# Patient Record
Sex: FEMALE | Race: WHITE | NOT HISPANIC OR LATINO | URBAN - METROPOLITAN AREA
[De-identification: names, ages, dates, MRNs, and addresses within clinical notes are randomized per-mention and may not be internally consistent; named-entity substitution may affect disease eponyms.]

---

## 2024-05-21 NOTE — ASU PATIENT PROFILE, ADULT - NS PREOP UNDERSTANDS INFO
No solid food/dairy/candy/gum after midnight; water allowed before 05:30am; reminded to come with photo ID/insurance card; escort to have a photo ID; dress in comfortable clothes; no jewelries/contact lens; no smoking/drinking alcohol/illicit drug use today; address and callback number was given./yes

## 2024-05-22 ENCOUNTER — OUTPATIENT (OUTPATIENT)
Dept: OUTPATIENT SERVICES | Facility: HOSPITAL | Age: 79
LOS: 1 days | Discharge: HOME | End: 2024-05-22

## 2024-05-22 VITALS
HEART RATE: 60 BPM | DIASTOLIC BLOOD PRESSURE: 43 MMHG | TEMPERATURE: 98 F | SYSTOLIC BLOOD PRESSURE: 123 MMHG | OXYGEN SATURATION: 97 % | RESPIRATION RATE: 16 BRPM

## 2024-05-22 VITALS
OXYGEN SATURATION: 99 % | HEIGHT: 62 IN | WEIGHT: 100.97 LBS | RESPIRATION RATE: 15 BRPM | HEART RATE: 56 BPM | DIASTOLIC BLOOD PRESSURE: 55 MMHG | SYSTOLIC BLOOD PRESSURE: 123 MMHG | TEMPERATURE: 98 F

## 2024-05-22 DIAGNOSIS — Z98.890 OTHER SPECIFIED POSTPROCEDURAL STATES: Chronic | ICD-10-CM

## 2024-05-22 DEVICE — IMPLANTABLE DEVICE
Type: IMPLANTABLE DEVICE | Status: NON-FUNCTIONAL
Removed: 2024-05-22

## 2024-05-22 RX ORDER — KETOROLAC TROMETHAMINE 0.5 %
1 DROPS OPHTHALMIC (EYE)
Refills: 0 | Status: COMPLETED | OUTPATIENT
Start: 2024-05-22 | End: 2024-05-22

## 2024-05-22 RX ORDER — FENTANYL CITRATE 50 UG/ML
25 INJECTION INTRAVENOUS
Refills: 0 | Status: DISCONTINUED | OUTPATIENT
Start: 2024-05-22 | End: 2024-05-22

## 2024-05-22 RX ORDER — PHENYLEPHRINE HCL 2.5 %
1 DROPS OPHTHALMIC (EYE)
Refills: 0 | Status: COMPLETED | OUTPATIENT
Start: 2024-05-22 | End: 2024-05-22

## 2024-05-22 RX ORDER — ONDANSETRON 8 MG/1
4 TABLET, FILM COATED ORAL ONCE
Refills: 0 | Status: DISCONTINUED | OUTPATIENT
Start: 2024-05-22 | End: 2024-05-22

## 2024-05-22 RX ORDER — OFLOXACIN 0.3 %
1 DROPS OPHTHALMIC (EYE)
Refills: 0 | Status: COMPLETED | OUTPATIENT
Start: 2024-05-22 | End: 2024-05-22

## 2024-05-22 RX ORDER — PHENYLEPHRINE HCL 2.5 %
1 DROPS OPHTHALMIC (EYE) ONCE
Refills: 0 | Status: COMPLETED | OUTPATIENT
Start: 2024-05-22 | End: 2024-05-22

## 2024-05-22 RX ORDER — SODIUM CHLORIDE 9 MG/ML
1000 INJECTION, SOLUTION INTRAVENOUS
Refills: 0 | Status: DISCONTINUED | OUTPATIENT
Start: 2024-05-22 | End: 2024-05-22

## 2024-05-22 RX ORDER — TROPICAMIDE 1 %
1 DROPS OPHTHALMIC (EYE)
Refills: 0 | Status: COMPLETED | OUTPATIENT
Start: 2024-05-22 | End: 2024-05-22

## 2024-05-22 RX ADMIN — Medication 1 DROP(S): at 07:25

## 2024-05-22 RX ADMIN — Medication 1 DROP(S): at 07:18

## 2024-05-22 RX ADMIN — Medication 1 DROP(S): at 07:11

## 2024-05-22 RX ADMIN — Medication 1 DROP(S): at 07:24

## 2024-05-22 RX ADMIN — Medication 1 DROP(S): at 07:17

## 2024-05-22 NOTE — ASU DISCHARGE PLAN (ADULT/PEDIATRIC) - NS MD DC FALL RISK RISK
For information on Fall & Injury Prevention, visit: [email or web address] OR  [email or web address] OR  https://www.cdc.gov/steadi/patient.html

## 2024-05-22 NOTE — PRE-ANESTHESIA EVALUATION ADULT - WEIGHT IN LBS
rec'd report from SHREE Barreto at 0659 and assumed care of this pt. Pt is awake/A&O x4 w/out ss of distress noted at this time. Pt denies pain/needs/complaints. Safety measures in place, call light w/in reach.    100.9

## 2024-05-22 NOTE — OPERATIVE REPORT - OPERATIVE RPOSRT DETAILS
Date of Procedure: 5/22/24    Pre Op Dx: 1)Cataract OD                      2)Astigmatism OD    Post Op Dx: Same    Procedure: Cataract Extraction with Toric IOL and femto laser OD    Surgeon: Basilia    Anesthesia: LMAC    Indications: Patient complains of progressive decrease in vision impairing activities of daily     living and has significant refractive astigmatism     ESTIMATED BLOOD LOSS: <1 cc    Complications: None  Procedure in detail:    Informed consent was obtained prior to admission. In the holding area, the operative eye was marked and topical antibiotic, mydriatic, and NSAID drops were administered. In the upright position, under topical anesthesia, the 3,6,9 o’clock meridians were marked at the limbus. The patient was brought to the Femtosecond laser suite and placed in the supine position. An operative timeout was observed. The laser program was reviewed and confirmed. Topical anesthetic was placed in the operative eye. The suction speculum was placed with care to center about the limbus. Suction was engaged and the well was filled with BSS. The patient was rotated under the laser and the chair elevated to submerge the AMADO.  Once docked and locked, the anterior segment OCT was performed and all anatomical margins were reviewed. The foot pedal was depressed and the entire laser program was carried out without loss of centration or suction. The suction was turned off and the patient removed from under the laser. The patient was brought to the OR and placed in the supine position. Monitors were placed and IV sedation was given. The operative eye received topical anesthetic and was prepped and draped in the usual sterile fashion including Betadine irrigation of the conjunctival fornices and isolation of the lashes. An operative timeout was observed. A lid speculum was placed followed by additional topical anesthetic. A paracentesis was created and 1% non-preserved lidocaine was placed in the anterior chamber. The chamber was then filled with dispersive viscoelastic. A temporal near clear corneal incision was created. A continuous curvilinear capsulorhexis was created followed by hydrodissection. The lens nucleus was rotated. The phacoemulsification handpiece was then tested.  The nuclear segments were  and each was elevated to the level of the anterior capsule and emulsified, taking care to keep the ultrasound tip at or below the iris plane. Additional dispersive viscoelastic was placed to protect the corneal endothelium as needed. Coaxial I/A was then used to remove the lens cortex. The capsular bag was inflated with cohesive viscoelastic and the posterior capsule was polished. The Mastel marker was used to ink the correct axis for alignment of the toric lens. The power of the foldable IOL was confirmed. The lens was brought onto the surgical field and inspected. It was placed in the insertion cartridge and loaded into the injector. The lens was delivered into the capsular bag where it unfolded atraumatically and centered well. It was rotated to align the toricity with the limbal marks. The viscoelastic was removed from behind and in front of the lens with the I/A. BSS on a canula was used to irrigate any residual viscoelastic or nuclear pieces from the angle and to hydrate the incisions. The chamber was returned to physiologic pressure with BSS. The IOL was noted to be centered and stable in the correct axis and the lens was gently pressed posteriorly to seat it in the capsular fornix.  The wounds were tested and found to be water tight. The lid speculum was removed. The eye received topical antibiotics and a patch and shield. The patient tolerated the procedure well and went to recovery in good condition.

## 2024-06-04 PROBLEM — E78.5 HYPERLIPIDEMIA, UNSPECIFIED: Chronic | Status: ACTIVE | Noted: 2024-05-21

## 2024-06-07 RX ORDER — SODIUM CHLORIDE 9 MG/ML
1000 INJECTION, SOLUTION INTRAVENOUS
Refills: 0 | Status: DISCONTINUED | OUTPATIENT
Start: 2024-06-12 | End: 2024-06-12

## 2024-06-11 NOTE — ASU PATIENT PROFILE, ADULT - PATIENT REPRESENTATIVE: ( YOU CAN CHOOSE ANY PERSON THAT CAN ASSIST YOU WITH YOUR HEALTH CARE PREFERENCES, DOES NOT HAVE TO BE A SPOUSE, IMMEDIATE FAMILY OR SIGNIFICANT OTHER/PARTNER)
Quality 110: Preventive Care And Screening: Influenza Immunization: Influenza immunization was not ordered or administered, reason not given Detail Level: Detailed Declines

## 2024-06-11 NOTE — ASU PATIENT PROFILE, ADULT - TEACHING/LEARNING OTHER LEARNERS
Duplicate request.  Pharmacy notified.              levonorgestrel-ethinyl estradiol (AVIANE,ALESSE,LESSINA) 0.1-20 MG-MCG per tablet 84 Tab 3 8/11/2018 No   Sig - Route: Take 1 Tab by mouth daily. - Oral   Order: [id number]   Order Transmittal   Destination Status   EPRESCRIBING INTERFACE Sent   Pharmacy   [facility] [id number]        spouse

## 2024-06-11 NOTE — ASU PATIENT PROFILE, ADULT - NS PREOP UNDERSTANDS INFO
No solid food/dairy/candy/gum after 10pm; water allowed before 4am; reminded to come with photo ID/insurance card; escort to have a photo ID; dress in comfortable clothes; no jewelries/contact lens; no smoking/drinking alcohol/illicit drug use today; address and callback number was given./yes

## 2024-06-12 ENCOUNTER — OUTPATIENT (OUTPATIENT)
Dept: OUTPATIENT SERVICES | Facility: HOSPITAL | Age: 79
LOS: 1 days | Discharge: HOME | End: 2024-06-12

## 2024-06-12 VITALS
WEIGHT: 101.85 LBS | SYSTOLIC BLOOD PRESSURE: 118 MMHG | OXYGEN SATURATION: 99 % | RESPIRATION RATE: 16 BRPM | TEMPERATURE: 97 F | HEIGHT: 62 IN | HEART RATE: 58 BPM | DIASTOLIC BLOOD PRESSURE: 54 MMHG

## 2024-06-12 VITALS
OXYGEN SATURATION: 99 % | HEART RATE: 60 BPM | RESPIRATION RATE: 16 BRPM | SYSTOLIC BLOOD PRESSURE: 124 MMHG | TEMPERATURE: 97 F | DIASTOLIC BLOOD PRESSURE: 50 MMHG

## 2024-06-12 DIAGNOSIS — Z98.49 CATARACT EXTRACTION STATUS, UNSPECIFIED EYE: Chronic | ICD-10-CM

## 2024-06-12 DIAGNOSIS — Z98.890 OTHER SPECIFIED POSTPROCEDURAL STATES: Chronic | ICD-10-CM

## 2024-06-12 DEVICE — LENS IOL ACRYSOF NATURAL CLEAR 19.0 D
Type: IMPLANTABLE DEVICE | Site: LEFT | Status: NON-FUNCTIONAL
Removed: 2024-06-12

## 2024-06-12 RX ORDER — ATORVASTATIN CALCIUM 80 MG/1
1 TABLET, FILM COATED ORAL
Refills: 0 | DISCHARGE

## 2024-06-12 RX ORDER — KETOROLAC TROMETHAMINE 0.5 %
1 DROPS OPHTHALMIC (EYE)
Refills: 0 | Status: COMPLETED | OUTPATIENT
Start: 2024-06-12 | End: 2024-06-12

## 2024-06-12 RX ORDER — OFLOXACIN 0.3 %
1 DROPS OPHTHALMIC (EYE)
Refills: 0 | Status: COMPLETED | OUTPATIENT
Start: 2024-06-12 | End: 2024-06-12

## 2024-06-12 RX ORDER — SUCRALFATE 1 G
2 TABLET ORAL
Refills: 0 | DISCHARGE

## 2024-06-12 RX ORDER — PHENYLEPHRINE HCL 2.5 %
1 DROPS OPHTHALMIC (EYE)
Refills: 0 | Status: COMPLETED | OUTPATIENT
Start: 2024-06-12 | End: 2024-06-12

## 2024-06-12 RX ORDER — CITALOPRAM 10 MG/1
1 TABLET, FILM COATED ORAL
Refills: 0 | DISCHARGE

## 2024-06-12 RX ORDER — PHENYLEPHRINE HCL 2.5 %
1 DROPS OPHTHALMIC (EYE) ONCE
Refills: 0 | Status: COMPLETED | OUTPATIENT
Start: 2024-06-12 | End: 2024-06-12

## 2024-06-12 RX ORDER — TROPICAMIDE 1 %
1 DROPS OPHTHALMIC (EYE)
Refills: 0 | Status: COMPLETED | OUTPATIENT
Start: 2024-06-12 | End: 2024-06-12

## 2024-06-12 RX ADMIN — Medication 1 DROP(S): at 06:36

## 2024-06-12 RX ADMIN — Medication 1 DROP(S): at 06:31

## 2024-06-12 RX ADMIN — Medication 1 DROP(S): at 06:41

## 2024-06-12 NOTE — OPERATIVE REPORT - OPERATIVE RPOSRT DETAILS
Date of Procedure: 6/12/24    Pre Op Dx: 1)Cataract OS                      2) Astigmatism OS    Post Op Dx: Same    Procedure: Cataract Extraction with Femtosecond laser incisions OS    Surgeon: Basilia    Anesthesia: LMAC    Indications: Patient complains of progressive decrease in vision impairing activities of daily     living and has significant refractive astigmatism     ESTIMATED BLOOD LOSS: <1 cc    Complications: None    Procedure in detail:    Informed consent was obtained prior to admission. In the holding area, the operative eye was marked and topical antibiotic, mydriatic, and NSAID drops were administered. In the upright position, under topical anesthesia, the 3,6,9 o’clock meridians were marked at the limbus. The patient was brought to the Femtosecond laser suite and placed in the supine position. An operative timeout was observed. The laser program was reviewed and confirmed. Topical anesthetic was placed in the operative eye. The suction speculum was placed with care to center about the limbus. Suction was engaged and the well was filled with BSS. The patient was rotated under the laser and the chair elevated to submerge the AMADO.  Once docked and locked, the anterior segment OCT was performed and all anatomical margins were reviewed. The foot pedal was depressed and the entire laser program was carried out without loss of centration or suction. The suction was turned off and the patient removed from under the laser. The patient was then brought to the OR and placed in the supine position. Monitors were placed and IV sedation was given. The operative eye received topical anesthetic and was prepped and draped in the usual sterile fashion including Betadine irrigation of the conjunctival fornices and isolation of the lashes. An operative timeout was observed. A lid speculum was placed followed by additional topical anesthetic. A paracentesis was created and 1% non-preserved lidocaine was placed in the anterior chamber. The chamber was then filled with dispersive viscoelastic. A temporal near clear corneal incision was created laser created capsulotomy was assessed and the anterior capsule was removed. No tags or tears were noted. Gentle hydrodissection was performed allowing release of trapped gas from behind the lens nucleus. The lens nucleus was rotated. The phacoemulsification handpiece was then tested.  The nuclear segments were  and each was elevated to the level of the anterior capsule and emulsified, taking care to keep the ultrasound tip at or below the iris plane. Additional dispersive viscoelastic was placed to protect the corneal endothelium as needed. Coaxial I/A was then used to remove the lens cortex. The capsular bag was inflated with cohesive viscoelastic and the posterior capsule was polished. The power of the foldable IOL was confirmed. The lens was brought onto the surgical field and inspected. It was placed in the insertion cartridge and loaded into the injector. The lens was delivered into the capsular bag where it unfolded atraumatically and centered well. The viscoelastic was removed from behind and in front of the lens with the I/A. BSS on a canula was used to irrigate any residual viscoelastic or nuclear pieces from the angle and to hydrate the incisions. The chamber was returned to physiologic pressure with BSS. The IOL was noted to be centered and stable and the lens was gently pressed posteriorly to seat it in the capsular fornix.  The wounds were tested and found to be water tight. The lid speculum was removed. The eye received topical antibiotics and a patch and shield. The patient tolerated the procedure well and went to recovery in good condition.

## (undated) DEVICE — DRAPE MICROSCOPE KNOB COVER SMALL (2 PCS)

## (undated) DEVICE — Device

## (undated) DEVICE — CAPSULE GUARD I/A

## (undated) DEVICE — SUT NYLON 10-0 12" CU-5

## (undated) DEVICE — SOL IRR BAG BSS 500ML

## (undated) DEVICE — GLV 8 PROTEXIS (WHITE)

## (undated) DEVICE — PACK ANTERIOR SEGMENT

## (undated) DEVICE — MARKING PEN DEVON DUAL TIP W RULER

## (undated) DEVICE — PACK CENTURION 2.4MM

## (undated) DEVICE — CANNULA FLEX TIP 45 DEG 27GAX22MM

## (undated) DEVICE — NUCLEUS HYDRODISSECTOR PEARCE ANGLED 25G X 22MM

## (undated) DEVICE — KNIFE ALCON STANDARD FULL HANDLE 15 DEG (PINK)